# Patient Record
Sex: FEMALE | Race: BLACK OR AFRICAN AMERICAN | NOT HISPANIC OR LATINO | Employment: OTHER | URBAN - METROPOLITAN AREA
[De-identification: names, ages, dates, MRNs, and addresses within clinical notes are randomized per-mention and may not be internally consistent; named-entity substitution may affect disease eponyms.]

---

## 2018-11-09 ENCOUNTER — TELEPHONE (OUTPATIENT)
Dept: CARDIOLOGY | Facility: CLINIC | Age: 83
End: 2018-11-09

## 2018-11-09 DIAGNOSIS — I50.9 CONGESTIVE HEART FAILURE, UNSPECIFIED HF CHRONICITY, UNSPECIFIED HEART FAILURE TYPE: ICD-10-CM

## 2018-11-09 DIAGNOSIS — I34.0 MITRAL VALVE INSUFFICIENCY, UNSPECIFIED ETIOLOGY: Primary | ICD-10-CM

## 2018-11-09 NOTE — TELEPHONE ENCOUNTER
Patient referred to Dr. Rice for MR.  Have tried to contact patient for last week.  No answer and not able to leave message.

## 2019-01-04 RX ORDER — ASPIRIN 81 MG/1
81 TABLET ORAL
COMMUNITY

## 2019-01-04 RX ORDER — CHOLECALCIFEROL (VITAMIN D3) 25 MCG
2000 TABLET ORAL
COMMUNITY

## 2019-01-04 RX ORDER — BENAZEPRIL HYDROCHLORIDE 20 MG/1
20 TABLET ORAL
COMMUNITY
Start: 2018-10-31 | End: 2019-04-29

## 2019-01-04 RX ORDER — AMOXICILLIN 250 MG
1 CAPSULE ORAL
COMMUNITY

## 2019-01-04 RX ORDER — FUROSEMIDE 40 MG/1
40 TABLET ORAL
COMMUNITY
End: 2019-01-08 | Stop reason: SDUPTHER

## 2019-01-04 RX ORDER — CARVEDILOL 6.25 MG/1
6.25 TABLET ORAL 2 TIMES DAILY
Refills: 0 | COMMUNITY
Start: 2018-12-31 | End: 2019-01-08 | Stop reason: SDUPTHER

## 2019-01-04 RX ORDER — LANOLIN ALCOHOL/MO/W.PET/CERES
CREAM (GRAM) TOPICAL
Refills: 0 | COMMUNITY
Start: 2018-12-31 | End: 2019-02-19

## 2019-01-04 RX ORDER — FERROUS SULFATE, DRIED 160(50) MG
1 TABLET, EXTENDED RELEASE ORAL
COMMUNITY

## 2019-01-04 RX ORDER — DEXTROMETHORPHAN HYDROBROMIDE, GUAIFENESIN 5; 100 MG/5ML; MG/5ML
1300 LIQUID ORAL
COMMUNITY

## 2019-01-04 RX ORDER — SIMVASTATIN 10 MG/1
10 TABLET, FILM COATED ORAL
COMMUNITY

## 2019-01-08 ENCOUNTER — HOSPITAL ENCOUNTER (OUTPATIENT)
Dept: CARDIOLOGY | Facility: CLINIC | Age: 84
Discharge: HOME | End: 2019-01-08
Attending: INTERNAL MEDICINE
Payer: MEDICARE

## 2019-01-08 ENCOUNTER — OFFICE VISIT (OUTPATIENT)
Dept: CARDIOLOGY | Facility: CLINIC | Age: 84
End: 2019-01-08
Payer: MEDICARE

## 2019-01-08 VITALS
HEIGHT: 63 IN | OXYGEN SATURATION: 95 % | DIASTOLIC BLOOD PRESSURE: 86 MMHG | WEIGHT: 169.75 LBS | BODY MASS INDEX: 30.08 KG/M2 | SYSTOLIC BLOOD PRESSURE: 176 MMHG | HEART RATE: 68 BPM

## 2019-01-08 VITALS
BODY MASS INDEX: 34.93 KG/M2 | WEIGHT: 185 LBS | HEART RATE: 68 BPM | SYSTOLIC BLOOD PRESSURE: 142 MMHG | DIASTOLIC BLOOD PRESSURE: 86 MMHG | HEIGHT: 61 IN

## 2019-01-08 DIAGNOSIS — I34.0 SEVERE MITRAL REGURGITATION: ICD-10-CM

## 2019-01-08 DIAGNOSIS — Z95.2 S/P AVR (AORTIC VALVE REPLACEMENT): ICD-10-CM

## 2019-01-08 DIAGNOSIS — I10 ESSENTIAL HYPERTENSION: ICD-10-CM

## 2019-01-08 DIAGNOSIS — I50.9 CONGESTIVE HEART FAILURE, UNSPECIFIED HF CHRONICITY, UNSPECIFIED HEART FAILURE TYPE: ICD-10-CM

## 2019-01-08 DIAGNOSIS — I34.0 MITRAL VALVE INSUFFICIENCY, UNSPECIFIED ETIOLOGY: Primary | ICD-10-CM

## 2019-01-08 DIAGNOSIS — N18.30 STAGE 3 CHRONIC KIDNEY DISEASE: ICD-10-CM

## 2019-01-08 DIAGNOSIS — I50.43 ACUTE ON CHRONIC COMBINED SYSTOLIC AND DIASTOLIC CHF, NYHA CLASS 3: ICD-10-CM

## 2019-01-08 DIAGNOSIS — I34.0 MITRAL VALVE INSUFFICIENCY, UNSPECIFIED ETIOLOGY: ICD-10-CM

## 2019-01-08 PROBLEM — E11.9 DM (DIABETES MELLITUS): Status: ACTIVE | Noted: 2019-01-08

## 2019-01-08 LAB
ASCENDING AORTA: 3.2 CM
AV INDEX (PROSTH): 0.42
AV MEAN GRADIENT: 8.01 MMHG
AV PEAK GRADIENT: 13.84 MMHG
AV VALVE AREA: 1.59 CM2
BSA FOR ECHO PROCEDURE: 1.9 M2
CV ECHO LV RWT: 0.39 CM
DOP CALC AO PEAK VEL: 1.86 M/S
DOP CALC AO VTI: 32.95 CM
DOP CALC LVOT AREA: 3.8 CM2
DOP CALC LVOT DIAMETER: 2.2 CM
DOP CALC LVOT STROKE VOLUME: 52.39 CM3
DOP CALCLVOT PEAK VEL VTI: 13.79 CM
E WAVE DECELERATION TIME: 114.25 MSEC
E/A RATIO: 1.78
E/E' RATIO: 39.71
ECHO LV POSTERIOR WALL: 0.97 CM (ref 0.6–1.1)
FRACTIONAL SHORTENING: 17 % (ref 28–44)
INTERVENTRICULAR SEPTUM: 0.95 CM (ref 0.6–1.1)
LA MAJOR: 6.51 CM
LA MINOR: 6.77 CM
LA WIDTH: 5.27 CM
LEFT ATRIUM SIZE: 4.22 CM
LEFT ATRIUM VOLUME INDEX: 68.7 ML/M2
LEFT ATRIUM VOLUME: 125.47 CM3
LEFT INTERNAL DIMENSION IN SYSTOLE: 4.1 CM (ref 2.1–4)
LEFT VENTRICLE DIASTOLIC VOLUME INDEX: 62.22 ML/M2
LEFT VENTRICLE DIASTOLIC VOLUME: 113.71 ML
LEFT VENTRICLE MASS INDEX: 91.8 G/M2
LEFT VENTRICLE SYSTOLIC VOLUME INDEX: 40.5 ML/M2
LEFT VENTRICLE SYSTOLIC VOLUME: 74.08 ML
LEFT VENTRICULAR INTERNAL DIMENSION IN DIASTOLE: 4.92 CM (ref 3.5–6)
LEFT VENTRICULAR MASS: 167.76 G
LV LATERAL E/E' RATIO: 46.33
LV SEPTAL E/E' RATIO: 34.75
MV PEAK A VEL: 0.78 M/S
MV PEAK E VEL: 1.39 M/S
PISA TR MAX VEL: 3.18 M/S
RA MAJOR: 5.67 CM
RA PRESSURE: 15 MMHG
RA WIDTH: 5.04 CM
RIGHT VENTRICULAR END-DIASTOLIC DIMENSION: 5 CM
RV TISSUE DOPPLER FREE WALL SYSTOLIC VELOCITY 1 (APICAL 4 CHAMBER VIEW): 9.89 M/S
SINUS: 2.91 CM
STJ: 2.32 CM
TDI LATERAL: 0.03
TDI SEPTAL: 0.04
TDI: 0.04
TR MAX PG: 40.45 MMHG
TRICUSPID ANNULAR PLANE SYSTOLIC EXCURSION: 1.95 CM
TV REST PULMONARY ARTERY PRESSURE: 55 MMHG

## 2019-01-08 PROCEDURE — 99205 OFFICE O/P NEW HI 60 MIN: CPT | Mod: S$GLB,,, | Performed by: INTERNAL MEDICINE

## 2019-01-08 PROCEDURE — 99999 PR PBB SHADOW E&M-EST. PATIENT-LVL III: ICD-10-PCS | Mod: PBBFAC,,,

## 2019-01-08 PROCEDURE — 99213 OFFICE O/P EST LOW 20 MIN: CPT | Mod: PBBFAC,25

## 2019-01-08 PROCEDURE — 99205 PR OFFICE/OUTPT VISIT, NEW, LEVL V, 60-74 MIN: ICD-10-PCS | Mod: S$GLB,,, | Performed by: INTERNAL MEDICINE

## 2019-01-08 PROCEDURE — 93306 TTE W/DOPPLER COMPLETE: CPT | Mod: PBBFAC | Performed by: INTERNAL MEDICINE

## 2019-01-08 PROCEDURE — 93306 TRANSTHORACIC ECHO (TTE) COMPLETE (CUPID ONLY): ICD-10-PCS | Mod: S$GLB,,, | Performed by: INTERNAL MEDICINE

## 2019-01-08 PROCEDURE — 99999 PR PBB SHADOW E&M-EST. PATIENT-LVL III: CPT | Mod: PBBFAC,,,

## 2019-01-08 RX ORDER — FUROSEMIDE 40 MG/1
60 TABLET ORAL DAILY
Qty: 45 TABLET | Refills: 11 | Status: SHIPPED | OUTPATIENT
Start: 2019-01-08

## 2019-01-08 RX ORDER — DIPHENHYDRAMINE HCL 25 MG
50 CAPSULE ORAL ONCE
Status: CANCELLED | OUTPATIENT
Start: 2019-01-08 | End: 2019-01-08

## 2019-01-08 RX ORDER — CLOPIDOGREL BISULFATE 75 MG/1
TABLET ORAL
Qty: 30 TABLET | Refills: 11 | Status: SHIPPED | OUTPATIENT
Start: 2019-01-08

## 2019-01-08 RX ORDER — DOXYLAMINE SUCCINATE 25 MG
TABLET ORAL
COMMUNITY

## 2019-01-08 RX ORDER — DEXTROSE MONOHYDRATE AND SODIUM CHLORIDE 5; .45 G/100ML; G/100ML
INJECTION, SOLUTION INTRAVENOUS CONTINUOUS
Status: CANCELLED | OUTPATIENT
Start: 2019-01-08

## 2019-01-08 RX ORDER — CARVEDILOL 12.5 MG/1
12.5 TABLET ORAL 2 TIMES DAILY
Qty: 60 TABLET | Refills: 11 | Status: SHIPPED | OUTPATIENT
Start: 2019-01-08

## 2019-01-08 RX ORDER — DICLOFENAC SODIUM 10 MG/G
2 GEL TOPICAL 4 TIMES DAILY PRN
COMMUNITY

## 2019-01-08 NOTE — PROGRESS NOTES
Cardiology Clinic Note  Reason for Visit: severe MR  Referring MD:Clinton    HPI:   89 y/o with hx of severe AS s/p 21mm Agarwal Magna Bioprosthetic in 2010, primary MR from flail posterior leaflet with acute on chronic systolic heart failure EF 40%(LVESD=4.4cm) and NYHA III Symptoms, HTN, DM, CKD here for kelly clip eval. PT reports progressive ONEIL over last several months. She now gets SOB with cooking and doing ADL's. +orthopnea, PND, LE edema. She was admitted for CHF at beginning of Jan and discharged on lasix 40mg QD which has helped symptoms some. She is very functional and lives with her son. She does all ADL's and ambulated with a walker. [facility] is [facility] 2 times weekly at [facility] but doesn't recall what she weighs. Her SBP runs in 160's. She denies havign angiogram recently and it seems per her Cardiologist the MR has been progressive with declining EF over last few years. She also has severe TR.     ROS:    Constitution: Negative for fever or chills. Negative for weight loss or gain.   HENT: Negative for sore throat or headaches. Negative for rhinorrhea.  Eyes: Negative for blurred or double vision.   Cardiovascular: See above  Pulmonary: +SOB. Negative for cough.   Gastrointestinal: Negative for abdominal pain. Negative for nausea/ vomiting. Negative for diarrhea.   : Negative for dysuria.   Neurological: Negative for focal weakness or sensory changes.  PMH:   History reviewed. No pertinent past medical history.  History reviewed. No pertinent surgical history.  Allergies:   Review of patient's allergies indicates:  No Known Allergies  Medications:     Current Outpatient Medications on File Prior to Visit   Medication Sig Dispense Refill    acetaminophen (TYLENOL) 650 MG TbSR Take 1,300 mg by mouth.      aspirin (ECOTRIN) 81 MG EC tablet Take 81 mg by mouth.      benazepril (LOTENSIN) 20 MG tablet Take 20 mg by mouth.      calcium-vitamin D3 (OS-JOEY 500 + D3) 500 mg(1,250mg) -200 unit per tablet  "Take 1 tablet by mouth.      diclofenac sodium (VOLTAREN) 1 % Gel Apply 2 g topically 4 (four) times daily as needed.      magnesium oxide (MAG-OX) 400 mg (241.3 mg magnesium) tablet TAKE 1 TAB(S) ORALLY ONCE A DAY X 4 DAYS  0    miconazole (MICOTIN) 2 % cream Apply topically.      senna-docusate 8.6-50 mg (PERICOLACE) 8.6-50 mg per tablet Take 1 tablet by mouth.      simvastatin (ZOCOR) 10 MG tablet Take 10 mg by mouth.      vitamin D (VITAMIN D3) 1000 units Tab Take 2,000 Units by mouth.      [DISCONTINUED] carvedilol (COREG) 6.25 MG tablet Take 6.25 mg by mouth 2 (two) times daily.  0    [DISCONTINUED] furosemide (LASIX) 40 MG tablet Take 40 mg by mouth.       No current facility-administered medications on file prior to visit.      Social History:     Social History     Tobacco Use    Smoking status: Never Smoker    Smokeless tobacco: Never Used   Substance Use Topics    Alcohol use: No     Frequency: Never     Family History:   History reviewed. No pertinent family history.  Physical Exam:   BP (!) 176/86 (BP Location: Right arm, Patient Position: Sitting, BP Method: Large (Automatic))   Pulse 68   Ht 5' 3" (1.6 m)   Wt 77 kg (169 lb 12.1 oz)   SpO2 95%   BMI 30.07 kg/m²      Constitutional: No acute distress, conversant  HEENT: Sclera anicteric, Pupils equal, round and reactive to light, extraocular motions intact, Oropharynx clear  Neck: [id number], no carotid bruits  Cardiovascular: regular rate and rhythm, 3/6 systolic murmur at apex  Pulmonary: Clear to auscultation bilaterally  Abdominal: Abdomen soft, nontender, nondistended, positive bowel sounds  Extremities: 1+ pitting BL LE edema  Pulses: 2+ BL Radial, 2+ BL carotid, 2+ BL DP, 2+ BL PT, normal Allens Test on R  Skin: No ecchymosis, erythema, or ulcers  Psych: Alert and oriented x 3, appropriate affect  Neuro: CNII-XII intact, no focal deficits    Labs:     Lab Results   Component Value Date     01/15/2010    K 3.8 01/15/2010    "  01/15/2010    CO2 25 01/15/2010    BUN 16 01/15/2010    CREATININE 1.3 01/15/2010    ANIONGAP 14 01/15/2010     Lab Results   Component Value Date    AST 16 01/12/2010    ALT 13 01/12/2010    ALKPHOS 42 (L) 01/12/2010    BILITOT 0.3 01/12/2010    ALBUMIN 3.8 01/12/2010     Lab Results   Component Value Date    CALCIUM 9.3 01/15/2010     Lab Results   Component Value Date     (H) 01/13/2010    Lab Results   Component Value Date    WBC 6.58 01/15/2010    HGB 11.3 (L) 01/15/2010    HCT 36.0 (L) 01/15/2010     (H) 01/15/2010    GRAN 2.8 01/15/2010    GRAN 41.9 01/15/2010     Lab Results   Component Value Date    INR 1.1 01/15/2010     Lab Results   Component Value Date    CHOL 176 07/29/2009    HDL 57 07/29/2009    LDLCALC 99.0 07/29/2009    TRIG 100 07/29/2009     Lab Results   Component Value Date    HGBA1C 6.9 (H) 01/13/2010     Lab Results   Component Value Date    TSH 2.14 07/29/2009          Imaging:       No results found for: EF    EKG: NSR, RBBB, LAFB  Assessment:     Severe mitral regurgitation  - Pt with primary MR from flail posterior leaflet with acute on chronic systolic heart failure EF 40%(LVESD=4.4cm) and NYHA III Symptoms  - She is very functional and still performs all ADL's and would be a good Zaida clip candidate if anatomy favorable on DIANDRA. Will cont with workup  - Will have her see CTS as I feel she would be high risk for MVR given redo sternotomy and age  - Will increase coreg 6.25mg-->12.5mg BID for better BP control and lasix from 40-->60mg  - Will perform LHC via R radial access with plavix load the night prior to procedure. Cont ASA. Will see if she can have DIANDRA performed same day she comes for LHC  - Need to check Cr today as baseline seems to be around 1.9 in notes from primary cardiologist in 10/2018    S/P AVR (aortic valve replacement)  - S/P 21mm Agarwal Magna Bioprosthetic in 2010  - Stable Valve function on echo    Acute on chronic combined systolic and diastolic  CHF, NYHA class 3  - Educated on daily weights and salt/fluid restriction  - Management per above      The risks (including death, heart attack, limb loss, paralysis, emergency surgery, bleeding, renal failure, and stroke), the potential benefits of the procedure, and the alternatives to the procedure, were discussed in detail and accepted by the patient. All questions were answered. Patient agrees to proceed.    Signed:  Ilya Mena MD  Cardiology Fellow  [phone]  1/8/2019 5:17 PM    Follow-up:   No future appointments.     I have personally taken the history and examined this patient. I have discussed and agree with the resident's findings and plan as documented in the resident's note.  Flail P2, appears to be good candidate for mitral clip. Will proceed with DIANDRA and work up and adjust HF meds today, with follow up in 1 month with Dr. RADHA Osei and me for further adjustment.   Temo Rice

## 2019-01-09 ENCOUNTER — EDUCATION (OUTPATIENT)
Dept: CARDIOLOGY | Facility: CLINIC | Age: 84
End: 2019-01-09

## 2019-01-09 ENCOUNTER — TELEPHONE (OUTPATIENT)
Dept: CARDIOLOGY | Facility: CLINIC | Age: 84
End: 2019-01-09

## 2019-01-09 NOTE — PROGRESS NOTES
OUTPATIENT CATHETERIZATION INSTRUCTIONS    You have been scheduled for a procedure in the catheterization lab on Thursday, January 24, 2019.     Please report to the Cardiology Waiting Area on the Third floor of the hospital and check in at 7 AM.   You will then be taken to the SSCU (Short Stay Cardiac Unit) and prepared for your procedure. Please be aware that this is not the time of your procedure but the time you are to arrive. The procedures are scheduled on an hourly basis; however, emergency cases take precedence over all other cases.       You may not have anything to eat or drink after midnight the night before your test. You may take your regular morning medications with water. If there are any medications that you should not take you will be instructed to hold them that morning. If you are diabetic and on Metformin (Glucophage) do not take it the day before, the day of, and for 2 days after your procedure.      The procedure will take 1-2 hours to perform. After the procedure, you will return to SSCU on the third floor of the hospital. You will need to lie still (or keep your arm still) for the next 4 to 6 hours to minimize bleeding from the puncture site. Your family may remain in the room with you during this time.       You may be able to be discharged home that same afternoon if there is someone to drive you home and there were no complications. If you have one of the balloon, stent, or device procedures you may spend the night in the hospital. Your doctor will determine, based on your progress, the date and time of your discharge. The results of your procedure will be discussed with you before you are discharged. Any further testing or procedures will be scheduled for you either before you leave or you will be called with these appointments.       If you should have any questions, concerns, or need to change the date of your procedure, please call CELIO Berger @ [phone]    Special  Instructions:  PLAVIX 75M TABLETS  NIGHT BEFORE AND 1 TABLET MORNING OF PROCEDURE        THE ABOVE INSTRUCTIONS WERE GIVEN TO THE PATIENT VERBALLY AND THEY VERBALIZED UNDERSTANDING.  THEY DO NOT REQUIRE ANY SPECIAL NEEDS AND DO NOT HAVE ANY LEARNING BARRIERS.          Directions for Reporting to Cardiology Waiting Area in the Hospital  If you park in the Parking Garage:  Take elevators to the1st floor of the parking garage.  Continue past the gift shop, coffee shop, and piano.  Take a right and go to the gold elevators. (Elevator B)  Take the elevator to the 3rd floor.  Follow the arrow on the sign on the wall that says Cath Lab Registration/EP Lab Registration.  Follow the long hallway all the way around until you come to a big open area.  This is the registration area.  Check in at Reception Desk.    OR    If family is dropping you off:  Have them drop you off at the front of the Hospital under the green overhang.  Enter through the doors and take a right.  Take the E elevators to the 3rd floor Cardiology Waiting Area.  Check in at the Reception Desk in the waiting room.

## 2019-01-09 NOTE — TELEPHONE ENCOUNTER
Spoke to son and confirmed appointment with Dr.H Osei on 01/11/19 @ 3pm at the [facility].  Also reviewed instructions for LHC and DIANDRA scheduled for 01/24/19.  Mailed to son's address as requested.

## 2019-01-18 ENCOUNTER — DOCUMENTATION ONLY (OUTPATIENT)
Dept: CARDIOLOGY | Facility: CLINIC | Age: 84
End: 2019-01-18

## 2019-01-18 NOTE — PROGRESS NOTES
Per Dr. Rice will cancel LHC +/- and DIANDRA scheduled for 01/24/19,  Patient will see Dr. Osei first for GDMT.  [facility] has approved Heart Failure visit and LHC/DIANDRA.  If medications are changed/adjusted will schedule LHC/DIANDRA 30 days after.  Daughter, Avis, is aware.  Dr. Osei's office to contact patient for appointment.

## 2019-02-19 ENCOUNTER — INITIAL CONSULT (OUTPATIENT)
Dept: TRANSPLANT | Facility: CLINIC | Age: 84
End: 2019-02-19
Payer: MEDICARE

## 2019-02-19 ENCOUNTER — LAB VISIT (OUTPATIENT)
Dept: LAB | Facility: HOSPITAL | Age: 84
End: 2019-02-19
Attending: INTERNAL MEDICINE
Payer: MEDICARE

## 2019-02-19 VITALS
DIASTOLIC BLOOD PRESSURE: 89 MMHG | HEART RATE: 62 BPM | WEIGHT: 170.44 LBS | HEIGHT: 63 IN | SYSTOLIC BLOOD PRESSURE: 184 MMHG | BODY MASS INDEX: 30.2 KG/M2

## 2019-02-19 DIAGNOSIS — I50.43 ACUTE ON CHRONIC COMBINED SYSTOLIC AND DIASTOLIC CHF, NYHA CLASS 3: Primary | ICD-10-CM

## 2019-02-19 DIAGNOSIS — I50.43 ACUTE ON CHRONIC COMBINED SYSTOLIC AND DIASTOLIC CHF, NYHA CLASS 3: ICD-10-CM

## 2019-02-19 DIAGNOSIS — I34.0 SEVERE MITRAL REGURGITATION: ICD-10-CM

## 2019-02-19 LAB
ANION GAP SERPL CALC-SCNC: 12 MMOL/L
BNP SERPL-MCNC: 1650 PG/ML
BUN SERPL-MCNC: 26 MG/DL
CALCIUM SERPL-MCNC: 8.4 MG/DL
CHLORIDE SERPL-SCNC: 106 MMOL/L
CO2 SERPL-SCNC: 23 MMOL/L
CREAT SERPL-MCNC: 1.8 MG/DL
EST. GFR  (AFRICAN AMERICAN): 28 ML/MIN/1.73 M^2
EST. GFR  (NON AFRICAN AMERICAN): 24.3 ML/MIN/1.73 M^2
GLUCOSE SERPL-MCNC: 117 MG/DL
POTASSIUM SERPL-SCNC: 3.5 MMOL/L
SODIUM SERPL-SCNC: 141 MMOL/L

## 2019-02-19 PROCEDURE — 99204 PR OFFICE/OUTPT VISIT, NEW, LEVL IV, 45-59 MIN: ICD-10-PCS | Mod: S$GLB,,, | Performed by: INTERNAL MEDICINE

## 2019-02-19 PROCEDURE — 99204 OFFICE O/P NEW MOD 45 MIN: CPT | Mod: S$GLB,,, | Performed by: INTERNAL MEDICINE

## 2019-02-19 PROCEDURE — 80048 BASIC METABOLIC PNL TOTAL CA: CPT

## 2019-02-19 PROCEDURE — 83880 ASSAY OF NATRIURETIC PEPTIDE: CPT

## 2019-02-19 PROCEDURE — 99999 PR PBB SHADOW E&M-EST. PATIENT-LVL III: ICD-10-PCS | Mod: PBBFAC,,, | Performed by: INTERNAL MEDICINE

## 2019-02-19 PROCEDURE — 99999 PR PBB SHADOW E&M-EST. PATIENT-LVL III: CPT | Mod: PBBFAC,,, | Performed by: INTERNAL MEDICINE

## 2019-02-19 PROCEDURE — 36415 COLL VENOUS BLD VENIPUNCTURE: CPT

## 2019-02-19 RX ORDER — FUROSEMIDE 20 MG/1
TABLET ORAL
COMMUNITY
Start: 2019-01-22

## 2019-02-19 RX ORDER — HYDRALAZINE HYDROCHLORIDE 25 MG/1
25 TABLET, FILM COATED ORAL
COMMUNITY
Start: 2019-02-18 | End: 2020-02-18

## 2019-02-19 NOTE — ASSESSMENT & PLAN NOTE
Agree that hydralazine help improve BP (can increase if needed)  Goal of getting BP < 150 / 80 and weight < 170 lb (presumably her dry weight) consistently at [facility] prior to mitral clip  .

## 2019-02-19 NOTE — PATIENT INSTRUCTIONS
Agree that hydralazine help improve BP (can increase if needed)  Goal of getting BP < 150 / 80 and weight < 170 lb consistently at [facility] prior to mitral clip

## 2019-02-20 NOTE — ASSESSMENT & PLAN NOTE
Pt with primary MR from flail posterior leaflet with acute on chronic systolic heart failure EF   - She is very functional and still performs all ADL's and would be a good Zaida clip candidate if anatomy favorable on DIANDRA. Will have her see CTS as I feel she would be high risk for MVR given redo sternotomy and age

## 2019-02-20 NOTE — PROGRESS NOTES
Subjective: class 3?    Patient ID:  Yazmin Haynes is a 89 y.o. female who presents for follow-up of Congestive Heart Failure (Consult Ref Dr. Rice/MR pt needs medical mgm't.)      HPI of severe AS s/p 21mm Agarwal Magna Bioprosthetic in 2010, primary MR from flail posterior leaflet with acute on chronic systolic heart failure EF 40%(LVESD=4.4cm) and NYHA III Symptoms, HTN, DM, CKD here for medical optimization prior to consideration for kelly clip   progressive ONEIL since Nov 2018 with three ER visit for edema in Dec / Jan  AT her Jan 2018 visit with dr Rice she was hypertension so her coreg increased from 6.25 to 12.5 BID and her lasis increase from 40 to 60   Additionally it was noted that her creatine as of Oct 2018 has been around 1.9 based on Dr Hernandes (had been 1.4 in summer when she was asymptomatic)  She saw Dr Ng this week who recommended she had hydralazine 25 TID to her regiment    Today reports continued edema Comes in wheelchair but reports reasonable [facility] Based on [facility] record she daughter brought in for dates 12/10/18 tp 2/18/19  her weight range has been 164 to 172 with a BP range of 150 to 178 / 71 to 98    Echo Jan 2019  · Moderately decreased left ventricular systolic function. The estimated ejection fraction is 35%  · Mild left ventricular enlargement. (4.92 cm LV end systolic 4.1)   · There is a bioprosthetic aortic valve present.  · Biatrial enlargement.  · Grade II (moderate) left ventricular diastolic dysfunction consistent with pseudonormalization.  · Elevated left atrial pressure.  · Mildly reduced right ventricular systolic function.  · Severe right ventricular enlargement.  · Severe mitral regurgitation with a flail of the middle/ medial scallop of the posterior leaflet  · Severe tricuspid regurgitation.  · The estimated PA systolic pressure is 55 mm Hg  · Pulmonary hypertension present.  · Elevated central venous pressure (15 mm Hg).  ROS    "  Objective:    Physical Exam   Constitutional: She appears well-developed and well-nourished. No distress.   BP (!) 184/89 (BP Location: Left arm, Patient Position: Sitting, BP Method: Large (Automatic))   Pulse 62   Ht 5' 3" (1.6 m)   Wt 77.3 kg (170 lb 6.7 oz)   BMI 30.19 kg/m²      HENT:   Head: Normocephalic and atraumatic. Head is without abrasion and without contusion.   Right Ear: External ear normal.   Left Ear: External ear normal.   Nose: Nose normal. No epistaxis.   Mouth/Throat: Oropharynx is clear and moist. Mucous membranes are not cyanotic.   Eyes: Conjunctivae and EOM are normal. Pupils are equal, round, and reactive to light.   Neck: Normal range of motion. Neck supple. No tracheal deviation present.   Cardiovascular: Normal rate, regular rhythm, normal heart sounds and normal pulses. Exam reveals no gallop.   No murmur heard.  Pulmonary/Chest: Effort normal and breath sounds normal. No stridor. No respiratory distress. She has no wheezes.   Abdominal: Soft. Normal appearance, normal aorta and bowel sounds are normal. She exhibits no distension. There is no tenderness.   Musculoskeletal: She exhibits no edema or tenderness.   Neurological: She is alert. She has normal strength and normal reflexes. She exhibits normal muscle tone.   Skin: Skin is warm. No rash noted. No erythema.   Psychiatric: She has a normal mood and affect. Her speech is normal and behavior is normal. Judgment and thought content normal. Cognition and memory are normal.       BMP  Lab Results   Component Value Date     02/19/2019    K 3.5 02/19/2019     02/19/2019    CO2 23 02/19/2019    BUN 26 02/19/2019    CREATININE 1.8 (H) 02/19/2019    CALCIUM 8.4 (L) 02/19/2019    ANIONGAP 12 02/19/2019    ESTGFRAFRICA 28.0 (A) 02/19/2019    EGFRNONAA 24.3 (A) 02/19/2019           BNP   Date Value Ref Range Status   02/19/2019 1,650 (H) 0 - 99 pg/mL Final     Comment:     Values of less than 100 pg/ml are consistent with " non-CHF populations.   01/13/2010 925 (H) 0 - 99 pg/mL Final     Comment:     Values of less than 100 pg/ml are consistent with non-CHF populations.             Assessment:       1. Acute on chronic combined systolic and diastolic CHF, NYHA class 3    2. Severe mitral regurgitation         Plan:       Acute on chronic combined systolic and diastolic CHF, NYHA class 3  Agree that hydralazine help improve BP (can increase if needed)  Goal of getting BP < 150 / 80 and weight < 165 lb consistently at [facility] prior to mitral clip  .    Severe mitral regurgitation  Pt with primary MR from flail posterior leaflet with acute on chronic systolic heart failure EF   - She is very functional and still performs all ADL's and would be a good Zaida clip candidate if anatomy favorable on DIANDRA. Will have her see CTS as I feel she would be high risk for MVR given redo sternotomy and age    Routed note to Dr Hernandes - will attempt to call him as well to get his input

## 2019-02-22 ENCOUNTER — DOCUMENTATION ONLY (OUTPATIENT)
Dept: CARDIOLOGY | Facility: CLINIC | Age: 84
End: 2019-02-22

## 2019-02-22 NOTE — PROGRESS NOTES
Received phone call from Ruth @ [facility] [place].  Patient and family have decided that at this time they do not wish to pursue any further treatment @ [facility] with Dr. Osei or Dr. Rice for her Mitral Valve.

## 2025-04-15 NOTE — ASSESSMENT & PLAN NOTE
- Pt with primary MR from flail posterior leaflet with acute on chronic systolic heart failure EF 40%(LVESD=4.4cm) and NYHA III Symptoms  - She is very functional and still performs all ADL's and would be a good Zaida clip candidate if anatomy favorable on DIANDRA. Will have her see CTS as I feel she would be high risk for MVR given redo sternotomy and age  - Will increase coreg 6.25mg-->12.5mg BID for better BP control and lasix from 40-->60mg  - Will perform LHC via R radial access with plavix load the night prior. Cont ASA. Will see if she can have DIANDRA performed same day she comes for LHC  - Need to check Cr today as baseline seems to be around 1.9 in notes from primary cardiologist in 10/2018  
- S/P 21mm Agarwal Magna Bioprosthetic in 2010  - Stable Valve function on echo  
Management per above  
Detail Level: Detailed
Detail Level: Generalized